# Patient Record
Sex: MALE | Race: WHITE | NOT HISPANIC OR LATINO | Employment: OTHER | URBAN - METROPOLITAN AREA
[De-identification: names, ages, dates, MRNs, and addresses within clinical notes are randomized per-mention and may not be internally consistent; named-entity substitution may affect disease eponyms.]

---

## 2017-03-27 ENCOUNTER — ALLSCRIPTS OFFICE VISIT (OUTPATIENT)
Dept: OTHER | Facility: OTHER | Age: 67
End: 2017-03-27

## 2017-09-12 ENCOUNTER — ALLSCRIPTS OFFICE VISIT (OUTPATIENT)
Dept: OTHER | Facility: OTHER | Age: 67
End: 2017-09-12

## 2018-01-10 NOTE — PSYCH
Psych Med Mgmt    Appearance: was calm and cooperative, adequate hygiene and grooming and good eye contact  Observed mood: was dysphoric and irritable  Observed mood: affect appropriate  Speech: a normal rate  Thought processes: coherent/organized  Hallucinations: no hallucinations present  Thought Content: no delusions  Abnormal Thoughts: The patient has no suicidal thoughts  Orientation: The patient is oriented to person, place and time  Recent and Remote Memory: short term memory intact and long term memory intact  Attention Span And Concentration: concentration intact  Insight: Insight intact  Judgment: His judgment was intact  Muscle Strength And Tone  slow gait with assistive device  The patient is experiencing moderate to severe pain  Risks, Benefits And Possible Side Effects Of Medications: Risks, benefits, and possible side effects of medications explained to patient and patient verbalizes understanding  Discussed with patient Black Box warning on concurrent use of benzodiazepines and opioid medications including sedation, respiratory depression, coma and death  Patient understands the risk of treatment with benzodiazepines in addition to opioids and wants to continue taking those medications  He reports normal appetite, decreased energy, no weight change and decrease in number of sleep hours   seen for bipolar disorder  A few days ago he fell twice but did not seek medical help  No apparent injuries though  No significant change in his clinical status or his life situation  Relationship with significant other is described as strained  No new issues with his children  His insurance will not authorize more than 4 clonazepams a day  Assessment    1  Bipolar mood disorder (296 80) (F31 9)    Plan    1  From  ClonazePAM 1 MG Oral Tablet one t i d + 2 hs To ClonazePAM 1 MG Oral   Tablet one t i d +1 at bedtime   2   BuPROPion HCl ER (XL) 150 MG Oral Tablet Extended Release 24 Hour; 2 every   morning and one at night    3  Gabapentin 400 MG Oral Capsule; one t i d+2 at hs    Review of Systems    Constitutional: No fever, no chills, feels well, no tiredness, no recent weight gain or loss  Cardiovascular: no complaints of slow or fast heart rate, no chest pain, no palpitations  Respiratory: no complaints of shortness of breath, no wheezing, no dyspnea on exertion  Gastrointestinal: no complaints of abdominal pain, no constipation, no nausea, no diarrhea, no vomiting  Genitourinary: no complaints of dysuria, no incontinence, no pelvic pain, no urinary frequency  Musculoskeletal: limb pain  Integumentary: no complaints of skin rash, no itching, no dry skin  Neurological: no complaints of headache, no confusion, no numbness, no dizziness  Active Problems    1  Bipolar mood disorder (296 80) (F31 9)   2  Chronic pain (338 29) (G89 29)    Allergies    1  Cephalosporins   2  Penicillins    Current Meds   1  BuPROPion HCl ER (XL) 150 MG Oral Tablet Extended Release 24 Hour; 2 every   morning and one at night; Therapy: 27ECS8426 to (Last Rx:15Mar2016)  Requested for: 79ZQX8537 Ordered   2  ClonazePAM 1 MG Oral Tablet; one t i d + 2 hs; Therapy: 83UQB5706 to (Last Rx:15Mar2016) Ordered   3  Gabapentin 400 MG Oral Capsule; one t i d+2 at hs;   Therapy: 09RFO9382 to (Last Rx:15Mar2016)  Requested for: 24OZV6693 Ordered   4  Oxycodone-Acetaminophen  MG Oral Tablet; TAKE 1 TABLET 4 TIMES DAILY AS   NEEDED FOR PAIN;   Therapy: 87LVZ6160 to (Evaluate:76Ojs4114); Last Rx:86Phv1624 Ordered    Family Psych History  Mother    1  No pertinent family history    Social History    · Never a smoker    End of Encounter Meds    1  BuPROPion HCl ER (XL) 150 MG Oral Tablet Extended Release 24 Hour; 2 every   morning and one at night; Therapy: 99UZD5870 to (Last Rx:14Nov2016)  Requested for: 80MYV7434 Ordered   2   ClonazePAM 1 MG Oral Tablet; one t i d +1 at bedtime; Therapy: 28GPZ0265 to (Last Rx:14Nov2016) Ordered    3  Gabapentin 400 MG Oral Capsule; one t i d+2 at hs;   Therapy: 92DFP1428 to (Last Rx:14Nov2016)  Requested for: 36RWU9275; Status: ACTIVE   - Transmit to Pharmacy - Awaiting Verification Ordered    4  Oxycodone-Acetaminophen  MG Oral Tablet (Percocet); TAKE 1 TABLET 4 TIMES   DAILY AS NEEDED FOR PAIN;   Therapy: 18TQI3165 to (Evaluate:09Gsv8527);  Last Rx:18Yjt7096 Ordered    Signatures   Electronically signed by : Daniel Almaguer MD; Nov 14 2016  2:59PM EST                       (Author)

## 2018-01-11 NOTE — PSYCH
Psych Med Mgmt    Appearance: was calm and cooperative, adequate hygiene and grooming and good eye contact  Observed mood: was dysphoric  Observed mood: affect was broad  Speech: a normal rate  Thought processes: coherent/organized  Hallucinations: no hallucinations present  Thought Content: no delusions  Abnormal Thoughts: The patient has no suicidal thoughts and no homicidal thoughts  Orientation: The patient is oriented to person, place and time  Recent and Remote Memory: short term memory intact and long term memory intact  Attention Span And Concentration: concentration intact  Insight: Insight intact  Judgment: His judgment was intact  Muscle Strength And Tone  slow with cane  The patient is experiencing moderate to severe pain  On a scale of 0 - 10 the pain severity is a 5  Treatment Recommendations: continue with same medications  continue with psychotherapy  Risks, Benefits And Possible Side Effects Of Medications: Risks, benefits, and possible side effects of medications explained to patient and patient verbalizes understanding  Seen for continuing care and pharmacotherapy  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 and also poor relationship with his daughter  She never visits without a "bodyguard"  His pain persists  cries everyday for multiple reasons,mostly missing his wife and children  conflicts with girlfriend are intermittent  continues to see Dr Andres Agrawal on a regular basis  his TSH is 0 01 but his BP is stable  Assessment    1  Affective bipolar disorder (296 80) (F31 9)    Plan    1  BuPROPion HCl ER (XL) 150 MG Oral Tablet Extended Release 24 Hour; 2 every   morning and one at night   2  ClonazePAM 1 MG Oral Tablet; one t i d + 2 hs    3  Gabapentin 400 MG Oral Capsule; one t i d+2 at hs    Review of Systems    Constitutional: No fever, no chills, feels well, no tiredness, no recent weight gain or loss     Cardiovascular: no complaints of slow or fast heart rate, no chest pain, no palpitations  Respiratory: no complaints of shortness of breath, no wheezing, no dyspnea on exertion  Gastrointestinal: no complaints of abdominal pain, no constipation, no nausea, no diarrhea, no vomiting  Genitourinary: no complaints of dysuria, no incontinence, no pelvic pain, no urinary frequency  Musculoskeletal: arthralgias, limb pain, myalgias and joint stiffness  Integumentary: no complaints of skin rash, no itching, no dry skin  Neurological: no complaints of headache, no confusion, no numbness, no dizziness  Active Problems    1  Chronic pain (338 29) (G89 29)    Allergies    1  Cephalosporins   2  Penicillins    Current Meds   1  BuPROPion HCl ER (XL) 150 MG Oral Tablet Extended Release 24 Hour; 2 every   morning and one at night; Therapy: 55WKE2652 to (Last [place])  Requested for: 23Nov2015 Ordered   2  ClonazePAM 1 MG Oral Tablet; one t i d + 2 hs; Therapy: 18ALI5866 to (Last Rx:23Nov2015) Ordered   3  Gabapentin 400 MG Oral Capsule; one t i d+2 at hs;   Therapy: 06YIM2512 to (Last Rx:23Nov2015)  Requested for: 23Nov2015 Ordered   4  Oxycodone-Acetaminophen  MG Oral Tablet; TAKE 1 TABLET 4 TIMES DAILY AS   NEEDED FOR PAIN;   Therapy: 47COY0800 to (Evaluate:03Vti2145); Last Rx:16Voq0704 Ordered    Family Psych History    1  No pertinent family history    Social History    · Never a smoker    End of Encounter Meds    1  BuPROPion HCl ER (XL) 150 MG Oral Tablet Extended Release 24 Hour; 2 every   morning and one at night; Therapy: 34JIS2136 to (Last Rx:15Mar2016)  Requested for: 12LOS1753 Ordered   2  ClonazePAM 1 MG Oral Tablet; one t i d + 2 hs; Therapy: 39RAM0589 to (Last Rx:15Mar2016) Ordered    3  Gabapentin 400 MG Oral Capsule; one t i d+2 at hs;   Therapy: 40VTB6375 to (Last Rx:15Mar2016)  Requested for: 48QJN8849 Ordered    4   Oxycodone-Acetaminophen  MG Oral Tablet (Percocet); TAKE 1 TABLET 4 TIMES   DAILY AS NEEDED FOR PAIN;   Therapy: 12OZM0453 to (Evaluate:73Qou5521);  Last Rx:28Brm9104 Ordered    Signatures   Electronically signed by : Ej Valladares MD; Mar 15 2016  3:57PM EST                       (Author)

## 2018-01-17 NOTE — PSYCH
Psych Med Mgmt    Appearance: was calm and cooperative, adequate hygiene and grooming and good eye contact  Observed mood: was dysphoric and anxious  Observed mood: affect was broad  Speech: a normal rate  Thought processes: coherent/organized  Hallucinations: no hallucinations present  Thought Content: no delusions  Abnormal Thoughts: The patient has somatic preoccupation  Orientation: The patient is oriented to person, place and time  Recent and Remote Memory: short term memory intact and long term memory intact  Attention Span And Concentration: concentration intact  Insight: Limited insight  Judgment: His judgment was intact  Muscle Strength And Tone  slow but steady  The patient was seen for continuing care and treatment of bipolar disorder  He continues to fixate on his back pain and how this has made him dysfunctional  There has been no change in his relationship with his children  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  Continues to see Dr Donna Roland on a regular basis  Sleep and appetite are variable  Assessment    1  Affective bipolar disorder (296 80) (F31 9)    Review of Systems    Constitutional: feeling poorly and feeling tired  Cardiovascular: no complaints of slow or fast heart rate, no chest pain, no palpitations  Respiratory: no complaints of shortness of breath, no wheezing, no dyspnea on exertion  Gastrointestinal: no complaints of abdominal pain, no constipation, no nausea, no diarrhea, no vomiting  Genitourinary: no complaints of dysuria, no incontinence, no pelvic pain, no urinary frequency  Musculoskeletal: arthralgias, limb pain and myalgias  Neurological: numbness  Active Problems    1  Affective bipolar disorder (296 80) (F31 9)   2  Chronic pain (338 29) (G28 29)    Allergies    1  Cephalosporins   2  Penicillins    Current Meds   1   BuPROPion HCl ER (XL) 150 MG Oral Tablet Extended Release 24 Hour; 2 every   morning and one at night; Therapy: 10LGA8293 to (Last Rx:15Mar2016)  Requested for: 32BWH6200 Ordered   2  ClonazePAM 1 MG Oral Tablet; one t i d + 2 hs; Therapy: 43JZQ7256 to (Last Rx:15Mar2016) Ordered   3  Gabapentin 400 MG Oral Capsule; one t i d+2 at hs;   Therapy: 35JFJ1465 to (Last Rx:15Mar2016)  Requested for: 24UMH3572 Ordered   4  Oxycodone-Acetaminophen  MG Oral Tablet; TAKE 1 TABLET 4 TIMES DAILY AS   NEEDED FOR PAIN;   Therapy: 55TVH7122 to (Evaluate:32Kkb9399); Last Rx:43Wzk1362 Ordered    Family Psych History  Mother    1  No pertinent family history    Social History    · Never a smoker    End of Encounter Meds    1  BuPROPion HCl ER (XL) 150 MG Oral Tablet Extended Release 24 Hour; 2 every   morning and one at night; Therapy: 40WAN4796 to (Last Rx:15Mar2016)  Requested for: 34MBV4271 Ordered   2  ClonazePAM 1 MG Oral Tablet; one t i d + 2 hs; Therapy: 75ZGR0388 to (Last Rx:15Mar2016) Ordered    3  Gabapentin 400 MG Oral Capsule; one t i d+2 at hs;   Therapy: 79DQN6596 to (Last Rx:15Mar2016)  Requested for: 19WUX6494 Ordered    4  Oxycodone-Acetaminophen  MG Oral Tablet (Percocet); TAKE 1 TABLET 4 TIMES   DAILY AS NEEDED FOR PAIN;   Therapy: 40QYT0675 to (Evaluate:31Kre8105);  Last Rx:65Eyf9327 Ordered    Future Appointments    Date/Time Provider Specialty Site   11/14/2016 02:30 PM Didier Paulino MD Psychiatry [facility]     Signatures   Electronically signed by : Javier Granado MD; Jul 18 2016  5:25PM EST                       (Author)

## 2018-01-18 NOTE — PSYCH
Psych Med Mgmt    Appearance: was calm and cooperative, adequate hygiene and grooming and good eye contact  Observed mood: affect was broad, but affect appropriate  Speech: a normal rate and fluent  Thought processes: coherent/organized  Hallucinations: no hallucinations present  Thought Content: no delusions  Abnormal Thoughts: The patient has somatic preoccupation  Orientation: The patient is oriented to person, place and time  Recent and Remote Memory: short term memory intact and long term memory intact  Attention Span And Concentration: concentration intact  Insight: Insight intact  Judgment: His judgment was intact  Muscle Strength And Tone  walks slowly and with a cane  Fund of knowledge: Patient displays adequate knowledge of current events  The patient is experiencing moderate to severe pain  Treatment Recommendations: continue with same medications  RTO in a few months  Risks, Benefits And Possible Side Effects Of Medications: Risks, benefits, and possible side effects of medications explained to patient and patient verbalizes understanding  seen for bipolar disorder  He had spinal surgery in February and continues to have pain and paresthesias  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 He then talked about his years in [employer] with [employer] atrocities he witness or participated in without giving any details  He had never talked about this to anyone until recently when it came up during therapy with Dr Donna Roland who he sees weekly  Assessment    1  Bipolar mood disorder (296 80) (F31 9)   2  Chronic pain (338 29) (G89 29)    Review of Systems    Constitutional: No fever, no chills, feels well, no tiredness, no recent weight gain or loss  Cardiovascular: no complaints of slow or fast heart rate, no chest pain, no palpitations  Respiratory: no complaints of shortness of breath, no wheezing, no dyspnea on exertion     Gastrointestinal: no complaints of abdominal pain, no constipation, no nausea, no diarrhea, no vomiting  Genitourinary: no complaints of dysuria, no incontinence, no pelvic pain, no urinary frequency  Musculoskeletal: no complaints of arthralgia, no myalgias, no limb pain, no joint stiffness  Integumentary: no complaints of skin rash, no itching, no dry skin  Neurological: no complaints of headache, no confusion, no numbness, no dizziness  Other Symptoms: pain and paresthesias  Active Problems    1  Bipolar mood disorder (296 80) (F31 9)   2  Chronic pain (338 29) (G89 29)    Allergies    1  Cephalosporins   2  Penicillins    Current Meds   1  BuPROPion HCl ER (XL) 150 MG Oral Tablet Extended Release 24 Hour; 2 every   morning and one at night; Therapy: 52UFI7125 to (Last Rx:27Mar2017)  Requested for: 27Mar2017 Ordered   2  ClonazePAM 1 MG Oral Tablet; one t i d +1 at bedtime; Therapy: 57RTB1187 to (Last Rx:27Mar2017) Ordered   3  Gabapentin 400 MG Oral Capsule; one t i d+2 at hs;   Therapy: 26FGG1677 to (Last Rx:27Mar2017)  Requested for: 95GMN5859 Ordered   4  Oxycodone-Acetaminophen  MG Oral Tablet; TAKE 1 TABLET 4 TIMES DAILY AS   NEEDED FOR PAIN;   Therapy: 39XUB2052 to (Evaluate:73Riv5166); Last Rx:90Iwg2118 Ordered    Family Psych History  Mother    1  No pertinent family history    Social History    · Never a smoker    End of Encounter Meds    1  BuPROPion HCl ER (XL) 150 MG Oral Tablet Extended Release 24 Hour; 2 every   morning and one at night; Therapy: 95JXM2922 to (Last Rx:27Mar2017)  Requested for: 27Mar2017 Ordered   2  ClonazePAM 1 MG Oral Tablet; one t i d +1 at bedtime; Therapy: 33VEK1418 to (Last Rx:27Mar2017) Ordered    3  Gabapentin 400 MG Oral Capsule; one t i d+2 at hs;   Therapy: 16RVK5513 to (Last Rx:27Mar2017)  Requested for: 63AVB8417 Ordered    4   Oxycodone-Acetaminophen  MG Oral Tablet (Percocet); TAKE 1 TABLET 4 TIMES   DAILY AS NEEDED FOR PAIN;   Therapy: 50TRM3038 to (Evaluate:31Oih4859);  Last Rx:71Pyf5671 Ordered    Signatures   Electronically signed by : Valeria Helm MD; Sep 12 2017  4:29PM EST                       (Author)

## 2018-01-18 NOTE — PSYCH
Psych Med Mgmt    Appearance: was calm and cooperative  Observed mood: mood appropriate  Observed mood: affect appropriate  Speech: a normal rate  Thought processes: tangential    speaking of [employer] and [employer]  Hallucinations: no hallucinations present  Thought Content: no delusions  Abnormal Thoughts: The patient has no suicidal thoughts and no homicidal thoughts  Orientation: The patient is oriented to person, place and time  Recent and Remote Memory: short term memory intact and long term memory intact  Attention Span And Concentration: concentration intact  Insight: Insight intact  Judgment: His judgment was intact  Treatment Recommendations: Continue with current medications and follow up in 6 months  He reports decreased appetite, normal appetite, normal energy level, no weight change and decrease in number of sleep hours   Patient being seen for follow-up of bipolar disorder  He reports feeling good on his medications  He feels like he is finally able to function  Relationship with significant other continues to remain strained, and he refers to as "more like a  " Patient somewhat tangential in his speech today  Religiously preoccupied and is talking about [employer] located in different countries and speaking of the [place] that he has built in his backyard  Talking about his interactions with the [facility]  Difficult to redirect  States that sleep has been poor recently, as he is only getting about 3 hours of sleep per night  Eating has been "sporadic " He does not feel as though his lack of sleep has been affecting his energy  Patient recently had a decompression procedure on lumbar spine  Using a walker to ambulate and suffering from back pain  Assessment    1  Bipolar mood disorder (997 80) (F31 9)    Plan    1  BuPROPion HCl ER (XL) 150 MG Oral Tablet Extended Release 24 Hour; 2 every   morning and one at night   2  ClonazePAM 1 MG Oral Tablet; one t i d +1 at bedtime    3  Gabapentin 400 MG Oral Capsule; one t i d+2 at hs    Review of Systems    Constitutional: No fever, no chills, feels well, no tiredness, no recent weight gain or loss  Cardiovascular: no complaints of slow or fast heart rate, no chest pain, no palpitations  Respiratory: no complaints of shortness of breath, no wheezing, no dyspnea on exertion  Gastrointestinal: no complaints of abdominal pain, no constipation, no nausea, no diarrhea, no vomiting  Genitourinary: no complaints of dysuria, no incontinence, no pelvic pain, no urinary frequency  Musculoskeletal: limb pain and slow, using a walker; back pain  Integumentary: no complaints of skin rash, no itching, no dry skin  Neurological: no complaints of headache, no confusion, no numbness, no dizziness  Active Problems    1  Bipolar mood disorder (296 80) (F31 9)   2  Chronic pain (338 29) (G89 29)    Allergies    1  Cephalosporins   2  Penicillins    Current Meds   1  BuPROPion HCl ER (XL) 150 MG Oral Tablet Extended Release 24 Hour; 2 every   morning and one at night; Therapy: 29BKJ8933 to (Last Rx:14Nov2016)  Requested for: 45AJE1165 Ordered   2  ClonazePAM 1 MG Oral Tablet; one t i d +1 at bedtime; Therapy: 37IQV0571 to (Last Rx:14Nov2016) Ordered   3  Gabapentin 400 MG Oral Capsule; one t i d+2 at hs;   Therapy: 16LNB8018 to (Last Rx:14Nov2016)  Requested for: 08ANH0365 Ordered   4  Oxycodone-Acetaminophen  MG Oral Tablet; TAKE 1 TABLET 4 TIMES DAILY AS   NEEDED FOR PAIN;   Therapy: 00OXW8429 to (Evaluate:47Ato0325); Last Rx:71Grh3822 Ordered    Family Psych History  Mother    1  No pertinent family history    Social History    · Never a smoker    End of Encounter Meds    1  BuPROPion HCl ER (XL) 150 MG Oral Tablet Extended Release 24 Hour; 2 every   morning and one at night; Therapy: 66IEX6824 to (Last Rx:27Mar2017)  Requested for: 27Mar2017 Ordered   2   ClonazePAM 1 MG Oral Tablet; one t i d +1 at bedtime; Therapy: 36MLV9634 to (Last Rx:27Mar2017) Ordered    3  Gabapentin 400 MG Oral Capsule; one t i d+2 at hs;   Therapy: 21NJG6197 to (Last Rx:27Mar2017)  Requested for: 84PEU8884 Ordered    4  Oxycodone-Acetaminophen  MG Oral Tablet (Percocet); TAKE 1 TABLET 4 TIMES   DAILY AS NEEDED FOR PAIN;   Therapy: 96KCV7514 to (Evaluate:24Efu0898);  Last Rx:34Myg1696 Ordered    Signatures   Electronically signed by : Lori Fuller [facility]; Mar 27 2017  4:33PM EST                       (Author)    Electronically signed by : Chloé Palomo MD; Mar 27 2017  5:25PM EST

## 2018-04-18 ENCOUNTER — OFFICE VISIT (OUTPATIENT)
Dept: PSYCHIATRY | Facility: CLINIC | Age: 68
End: 2018-04-18
Payer: MEDICARE

## 2018-04-18 DIAGNOSIS — F31.70 BIPOLAR AFFECTIVE DISORDER IN REMISSION (HCC): Primary | ICD-10-CM

## 2018-04-18 PROCEDURE — 99213 OFFICE O/P EST LOW 20 MIN: CPT | Performed by: PSYCHIATRY & NEUROLOGY

## 2018-04-18 RX ORDER — BUPROPION HYDROCHLORIDE 150 MG/1
TABLET, EXTENDED RELEASE ORAL
Qty: 270 TABLET | Refills: 1 | Status: SHIPPED | OUTPATIENT
Start: 2018-04-18 | End: 2018-08-20 | Stop reason: SDUPTHER

## 2018-04-18 RX ORDER — GABAPENTIN 400 MG/1
1 CAPSULE ORAL 4 TIMES DAILY
COMMUNITY
Start: 2015-07-29 | End: 2018-04-18 | Stop reason: SDUPTHER

## 2018-04-18 RX ORDER — CLONAZEPAM 1 MG/1
1 TABLET ORAL 4 TIMES DAILY
Qty: 360 TABLET | Refills: 1 | Status: SHIPPED | OUTPATIENT
Start: 2018-04-18 | End: 2018-08-20 | Stop reason: SDUPTHER

## 2018-04-18 RX ORDER — GABAPENTIN 400 MG/1
400 CAPSULE ORAL 4 TIMES DAILY
Qty: 360 CAPSULE | Refills: 1 | Status: SHIPPED | OUTPATIENT
Start: 2018-04-18 | End: 2018-08-20 | Stop reason: SDUPTHER

## 2018-04-18 RX ORDER — CLONAZEPAM 1 MG/1
1 TABLET ORAL 4 TIMES DAILY
COMMUNITY
Start: 2015-07-29 | End: 2018-04-18 | Stop reason: SDUPTHER

## 2018-04-18 RX ORDER — BUPROPION HYDROCHLORIDE 150 MG/1
3 TABLET ORAL DAILY
COMMUNITY
Start: 2015-11-23 | End: 2018-04-18 | Stop reason: CLARIF

## 2018-04-18 NOTE — PSYCH
Patient ID: Yanet Garcia is a 75 y o  male  HPI ROS Appetite Changes and Sleep: normal appetite, normal energy level, no weight change and decrease in number of sleep hours due to frequent awakenings    Review Of Systems:     Mood Normal   Thought Content Normal   General Sleep Disturbances   Gastrointestinal Normal   Neurological Normal        Laboratory Results: No results found for this or any previous visit  Subjective:      Wakes himself up crying every night and doesn't know why although he speculates that it could be related to his negative experiences in the past   Normal appetite   Relationship with children is stable  He reported that since his last visit he had 2 episodes of "temporary paralysis" and Med[facility] to [facility] records exist and he then said he got the dates mixed up (confabulations?)  Still in therapy with Dr Romie Anthony  Objective:   Seems to be at baseline    Mental status:  Appearance calm and cooperative , adequate hygiene and grooming and good eye contact    Mood euthymic   Affect affect was broad   Speech a normal rate and fluent   Thought Processes coherent/organized   Hallucinations no hallucinations present    Thought Content no delusional thoughts verbalized   Abnormal Thoughts no suicidal thoughts    Orientation A+O x 3   Remote Memory short term memory intact and long term memory intact   Attention Span concentration intact   Intellect Not Formally Assessed   Insight Insight intact   Judgement judgment was intact   Muscle Strength Normal gait  and needs cane   Language no difficulty naming common objects and no difficulty repeating a phrase    Pain none       Assessment/Plan:       Diagnoses and all orders for this visit:    Bipolar affective disorder in remission ([facility] [address] )  -     gabapentin (NEURONTIN) 400 mg capsule; Take 1 capsule (400 mg total) by mouth 4 (four) times a day One t i d +2 hs  -     clonazePAM (KlonoPIN) 1 mg tablet;  Take 1 tablet (1 mg total) by mouth 4 (four) times a day  -     buPROPion (WELLBUTRIN SR) 150 mg 12 hr tablet; 2 in the morning + one at night    Other orders  -     Discontinue: gabapentin (NEURONTIN) 400 mg capsule; Take 1 capsule by mouth 4 (four) times a day One t i d +2 hs  -     Discontinue: clonazePAM (KlonoPIN) 1 mg tablet; Take 1 tablet by mouth 4 (four) times a day  -     Discontinue: buPROPion (WELLBUTRIN XL) 150 mg 24 hr tablet;  Take 3 tablets by mouth daily 2 in the morning+ one at night             Treatment Recommendations- Risks Benefits      Risks, Benefits And Possible Side Effects Of Medications:  Risks, benefits, and possible side effects of medications explained to patient and patient verbalizes understanding

## 2018-08-20 ENCOUNTER — OFFICE VISIT (OUTPATIENT)
Dept: PSYCHIATRY | Facility: CLINIC | Age: 68
End: 2018-08-20
Payer: MEDICARE

## 2018-08-20 DIAGNOSIS — F31.70 BIPOLAR AFFECTIVE DISORDER IN REMISSION (HCC): ICD-10-CM

## 2018-08-20 PROCEDURE — 99213 OFFICE O/P EST LOW 20 MIN: CPT | Performed by: PSYCHIATRY & NEUROLOGY

## 2018-08-20 RX ORDER — GABAPENTIN 400 MG/1
400 CAPSULE ORAL 4 TIMES DAILY
Qty: 360 CAPSULE | Refills: 1 | Status: SHIPPED | OUTPATIENT
Start: 2018-08-20 | End: 2018-08-20 | Stop reason: SDUPTHER

## 2018-08-20 RX ORDER — CLONAZEPAM 1 MG/1
1 TABLET ORAL 4 TIMES DAILY
Qty: 360 TABLET | Refills: 1 | Status: SHIPPED | OUTPATIENT
Start: 2018-08-20 | End: 2018-12-05 | Stop reason: SDUPTHER

## 2018-08-20 RX ORDER — GABAPENTIN 400 MG/1
400 CAPSULE ORAL 4 TIMES DAILY
Qty: 360 CAPSULE | Refills: 1 | Status: SHIPPED | OUTPATIENT
Start: 2018-08-20 | End: 2018-12-05 | Stop reason: SDUPTHER

## 2018-08-20 RX ORDER — BUPROPION HYDROCHLORIDE 150 MG/1
TABLET, EXTENDED RELEASE ORAL
Qty: 270 TABLET | Refills: 1 | Status: SHIPPED | OUTPATIENT
Start: 2018-08-20 | End: 2018-08-20 | Stop reason: SDUPTHER

## 2018-08-20 RX ORDER — BUPROPION HYDROCHLORIDE 150 MG/1
TABLET, EXTENDED RELEASE ORAL
Qty: 270 TABLET | Refills: 1 | Status: SHIPPED | OUTPATIENT
Start: 2018-08-20 | End: 2018-12-05 | Stop reason: SDUPTHER

## 2018-08-20 RX ORDER — CLONAZEPAM 1 MG/1
1 TABLET ORAL 4 TIMES DAILY
Qty: 360 TABLET | Refills: 1 | Status: SHIPPED | OUTPATIENT
Start: 2018-08-20 | End: 2018-08-20 | Stop reason: SDUPTHER

## 2018-08-20 NOTE — PSYCH
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 and attributes that to back pain  Has disturbing dreams, waking up crying but does not remember these dreams  Still in treatment with Dr Huertas Doing  Objective: At baseline  Mental status:  Appearance calm and cooperative , adequate hygiene and grooming and good eye contact    Mood euthymic   Affect affect was broad   Speech Normal rate and Normal volume   Thought Processes coherent/organized   Hallucinations no hallucinations present    Thought Content no delusional thoughts verbalized   Abnormal Thoughts no suicidal thoughts  and no homicidal thoughts    Orientation A+O x 3   Memory function Not tested   Attention Span concentration intact   Intellect Not Formally Assessed   Insight Insight intact   Judgement judgment was intact   Muscle Strength Abnormal gait   Language no difficulty naming common objects and no difficulty repeating a phrase    Pain Back pain       Assessment/Plan:       Diagnoses and all orders for this visit:    Bipolar affective disorder in remission ([facility] [address] )  -     Discontinue: gabapentin (NEURONTIN) 400 mg capsule; Take 1 capsule (400 mg total) by mouth 4 (four) times a day One t i d +2 hs  -     Discontinue: clonazePAM (KlonoPIN) 1 mg tablet;  Take 1 tablet (1 mg total) by mouth 4 (four) times a day  -     Discontinue: buPROPion (WELLBUTRIN SR) 150 mg 12 hr tablet; 2 in the morning + one at night  -     Discontinue: gabapentin (NEURONTIN) 400 mg capsule; Take 1 capsule (400 mg total) by mouth 4 (four) times a day One t i d +2 hs  -     Discontinue: buPROPion (WELLBUTRIN SR) 150 mg 12 hr tablet; 2 in the morning + one at night  -     gabapentin (NEURONTIN) 400 mg capsule; Take 1 capsule (400 mg total) by mouth 4 (four) times a day One t i d +2 hs  -     clonazePAM (KlonoPIN) 1 mg tablet;  Take 1 tablet (1 mg total) by mouth 4 (four) times a day  -     buPROPion (WELLBUTRIN SR) 150 mg 12 hr tablet; 2 in the morning + one at night            Treatment Recommendations- Risks Benefits      Risks, Benefits And Possible Side Effects Of Medications:  Risks, benefits, and possible side effects of medications explained to patient and patient verbalizes understanding

## 2018-12-05 ENCOUNTER — OFFICE VISIT (OUTPATIENT)
Dept: PSYCHIATRY | Facility: CLINIC | Age: 68
End: 2018-12-05
Payer: MEDICARE

## 2018-12-05 DIAGNOSIS — F31.70 BIPOLAR AFFECTIVE DISORDER IN REMISSION (HCC): ICD-10-CM

## 2018-12-05 DIAGNOSIS — F31.70 BIPOLAR DISORDER IN PARTIAL REMISSION, MOST RECENT EPISODE UNSPECIFIED TYPE (HCC): Primary | ICD-10-CM

## 2018-12-05 PROCEDURE — 99213 OFFICE O/P EST LOW 20 MIN: CPT | Performed by: PSYCHIATRY & NEUROLOGY

## 2018-12-05 RX ORDER — GABAPENTIN 400 MG/1
CAPSULE ORAL
Qty: 450 CAPSULE | Refills: 1 | Status: SHIPPED | OUTPATIENT
Start: 2018-12-05 | End: 2019-06-05 | Stop reason: SDUPTHER

## 2018-12-05 RX ORDER — BUPROPION HYDROCHLORIDE 150 MG/1
TABLET, EXTENDED RELEASE ORAL
Qty: 270 TABLET | Refills: 1 | Status: SHIPPED | OUTPATIENT
Start: 2018-12-05 | End: 2019-06-05 | Stop reason: SDUPTHER

## 2018-12-05 RX ORDER — CLONAZEPAM 1 MG/1
1 TABLET ORAL 4 TIMES DAILY
Qty: 360 TABLET | Refills: 1 | Status: SHIPPED | OUTPATIENT
Start: 2018-12-05 | End: 2019-06-05 | Stop reason: SDUPTHER

## 2018-12-05 NOTE — PSYCH
Patient ID: Jann Erazo is a 70 y o  male  HPI ROS Appetite Changes and Sleep: normal appetite, normal energy level, no weight change and normal number of sleep hours    Review Of Systems:     Mood variable   Thought Content Normal   General Normal    Gastrointestinal Normal   Neurological Normal        Laboratory Results: No results found for this or any previous visit  Subjective:    Seen for bipolar disorder  Still dealing with same physical issues with back pain  His pain has been controlled with epidural injections  Somatically preoccupied mainly on his pain  No change in relationship with his children   Objective:   Seems to be at baseline  Mental status:  Appearance calm and cooperative , adequate hygiene and grooming and good eye contact    Mood dysphoric   Affect affect was broad and affect appropriate    Speech Normal rate and Normal volume   Thought Processes coherent/organized   Hallucinations no hallucinations present    Thought Content no delusional thoughts verbalized-somatically preoccupied  Abnormal Thoughts no suicidal thoughts  and no homicidal thoughts    Orientation A+O x 3   Memory function Not tested   Attention Span concentration intact   Intellect Not Formally Assessed   Insight Insight intact   Judgement judgment was intact   Muscle Strength Muscle strength and tone were normal   Language no difficulty naming common objects   Pain none       Assessment/Plan:      Treatment Recommendations- Risks Benefits    Same meds and RTO in 4 months    Risks, Benefits And Possible Side Effects Of Medications:  Risks, benefits, and possible side effects of medications explained to patient and patient verbalizes understanding

## 2019-06-05 ENCOUNTER — OFFICE VISIT (OUTPATIENT)
Dept: PSYCHIATRY | Facility: CLINIC | Age: 69
End: 2019-06-05
Payer: MEDICARE

## 2019-06-05 DIAGNOSIS — F31.70 BIPOLAR AFFECTIVE DISORDER IN REMISSION (HCC): ICD-10-CM

## 2019-06-05 PROCEDURE — 99213 OFFICE O/P EST LOW 20 MIN: CPT | Performed by: PSYCHIATRY & NEUROLOGY

## 2019-06-05 RX ORDER — GABAPENTIN 400 MG/1
CAPSULE ORAL
Qty: 450 CAPSULE | Refills: 1 | Status: SHIPPED | OUTPATIENT
Start: 2019-06-05 | End: 2019-12-04 | Stop reason: SDUPTHER

## 2019-06-05 RX ORDER — BUPROPION HYDROCHLORIDE 150 MG/1
TABLET, EXTENDED RELEASE ORAL
Qty: 270 TABLET | Refills: 1 | Status: SHIPPED | OUTPATIENT
Start: 2019-06-05 | End: 2019-12-04 | Stop reason: SDUPTHER

## 2019-06-05 RX ORDER — CLONAZEPAM 1 MG/1
1 TABLET ORAL 4 TIMES DAILY
Qty: 360 TABLET | Refills: 1 | Status: SHIPPED | OUTPATIENT
Start: 2019-06-05 | End: 2019-12-04 | Stop reason: SDUPTHER

## 2019-12-04 DIAGNOSIS — F31.70 BIPOLAR AFFECTIVE DISORDER IN REMISSION (HCC): ICD-10-CM

## 2019-12-04 RX ORDER — CLONAZEPAM 1 MG/1
1 TABLET ORAL 4 TIMES DAILY
Qty: 360 TABLET | Refills: 1 | Status: SHIPPED | OUTPATIENT
Start: 2019-12-04 | End: 2020-08-10 | Stop reason: SDUPTHER

## 2019-12-04 RX ORDER — GABAPENTIN 400 MG/1
CAPSULE ORAL
Qty: 450 CAPSULE | Refills: 1 | Status: SHIPPED | OUTPATIENT
Start: 2019-12-04 | End: 2020-08-10 | Stop reason: SDUPTHER

## 2019-12-04 RX ORDER — BUPROPION HYDROCHLORIDE 150 MG/1
TABLET, EXTENDED RELEASE ORAL
Qty: 270 TABLET | Refills: 1 | Status: SHIPPED | OUTPATIENT
Start: 2019-12-04 | End: 2020-06-08

## 2019-12-26 ENCOUNTER — OFFICE VISIT (OUTPATIENT)
Dept: PSYCHIATRY | Facility: CLINIC | Age: 69
End: 2019-12-26
Payer: MEDICARE

## 2019-12-26 DIAGNOSIS — F31.70 BIPOLAR AFFECTIVE DISORDER IN REMISSION (HCC): Primary | ICD-10-CM

## 2019-12-26 PROCEDURE — 99213 OFFICE O/P EST LOW 20 MIN: CPT | Performed by: PSYCHIATRY & NEUROLOGY

## 2019-12-26 NOTE — PSYCH
Patient ID: Morgan Rodriguez is a 78 y o  male  Review Of Systems:     Mood Euthymic   Thought Content Normal   General As HPI   Physical symptoms As Noted in HPI       Laboratory Results: No results found for this or any previous visit  Subjective:    Seen for bipolar disorder  Continues to have pain and other chronic medical problems  His 15years old granddaughter was diagnosed with pancreatic cancer  Same dynamics in relationships with his children  Still in therapy with Dr Marie Preston  His PCP took him off of opiates and prescribed Cymbalta 30 mg daily to control opiate withdrawal according to the patient  He will be stopping Cymbalta  Objective:   Overall stable    Mental status:  Appearance calm and cooperative , adequate hygiene and grooming and good eye contact    Mood euthymic   Affect affect was broad   Speech Normal rate and Normal volume   Thought Processes coherent/organized   Hallucinations no hallucinations present    Thought Content no delusional thoughts verbalized   Abnormal Thoughts no suicidal thoughts  and no homicidal thoughts    Orientation A+O x 3       Assessment/Plan:       Diagnoses and all orders for this visit:    Bipolar affective disorder in remission ([facility] [address] )        1   Bipolar affective disorder in remission [facility])        Treatment Recommendations- Risks Benefits      Risks, Benefits And Possible Side Effects Of Medications:  Risks, benefits, and possible side effects of medications explained to patient and patient verbalizes understanding

## 2020-06-04 DIAGNOSIS — F31.70 BIPOLAR AFFECTIVE DISORDER IN REMISSION (HCC): ICD-10-CM

## 2020-06-08 RX ORDER — BUPROPION HYDROCHLORIDE 150 MG/1
TABLET, EXTENDED RELEASE ORAL
Qty: 270 TABLET | Refills: 0 | Status: SHIPPED | OUTPATIENT
Start: 2020-06-08 | End: 2020-08-10 | Stop reason: SDUPTHER

## 2020-08-10 ENCOUNTER — OFFICE VISIT (OUTPATIENT)
Dept: PSYCHIATRY | Facility: CLINIC | Age: 70
End: 2020-08-10
Payer: MEDICARE

## 2020-08-10 DIAGNOSIS — F31.70 BIPOLAR AFFECTIVE DISORDER IN REMISSION (HCC): ICD-10-CM

## 2020-08-10 PROCEDURE — 99213 OFFICE O/P EST LOW 20 MIN: CPT | Performed by: PSYCHIATRY & NEUROLOGY

## 2020-08-10 RX ORDER — BUPROPION HYDROCHLORIDE 150 MG/1
TABLET, EXTENDED RELEASE ORAL
Qty: 270 TABLET | Refills: 1 | Status: SHIPPED | OUTPATIENT
Start: 2020-08-10 | End: 2021-02-26 | Stop reason: SDUPTHER

## 2020-08-10 RX ORDER — CLONAZEPAM 1 MG/1
1 TABLET ORAL 4 TIMES DAILY
Qty: 360 TABLET | Refills: 1 | Status: SHIPPED | OUTPATIENT
Start: 2020-08-10 | End: 2021-02-26 | Stop reason: SDUPTHER

## 2020-08-10 RX ORDER — GABAPENTIN 400 MG/1
CAPSULE ORAL
Qty: 450 CAPSULE | Refills: 1 | Status: SHIPPED | OUTPATIENT
Start: 2020-08-10 | End: 2021-02-26 | Stop reason: SDUPTHER

## 2020-08-10 NOTE — PSYCH
Progress Note - Behavioral Health   Greta [place] 69 y o  male MRN: [id number]  Unit/Bed#:  Encounter: [id number]    The patient was seen for continuing care and pharmacotherapy  Since his last visit he has had a number of health problems and falls  He tells me that I had given him my home phone number years ago and when he tried to contact  me the person who answered the phone was rude to him  He might be confabulating  He is disheveled and appears to be confused  He then apologized to me for the misunderstanding  He reports that he lives independently  His children don't bother with him  Denies alcohol consumption  Current Mental Status Evaluation:  Appearance:  Good eye contact and disheveled  Somewhat eccentric   Behavior:  calm and cooperative   Mood:  dysphoric   Affect: appropriate   Speech: Normal rate and Normal volume   Thought Process: Tangential- confabulation   Thought Content:  Does not verbalize delusional material   Perceptual Disturbances: Denies hallucinations and does not appear to be responding to internal stimuli   Risk Potential: No suicidal or homicidal ideation   Orientation:        Progress Toward Goals: At baseline      1  Bipolar affective disorder in remission (HCC)  buPROPion (WELLBUTRIN SR) 150 mg 12 hr tablet    clonazePAM (KlonoPIN) 1 mg tablet    gabapentin (NEURONTIN) 400 mg capsule        Recommended Treatment: Continue with pharmacotherapy  RTO in 6 months

## 2020-08-13 ENCOUNTER — DOCUMENTATION (OUTPATIENT)
Dept: PSYCHIATRY | Facility: CLINIC | Age: 70
End: 2020-08-13

## 2021-02-26 ENCOUNTER — TELEMEDICINE (OUTPATIENT)
Dept: PSYCHIATRY | Facility: CLINIC | Age: 71
End: 2021-02-26
Payer: MEDICARE

## 2021-02-26 DIAGNOSIS — F31.70 BIPOLAR AFFECTIVE DISORDER IN REMISSION (HCC): ICD-10-CM

## 2021-02-26 DIAGNOSIS — F31.0 BIPOLAR AFFECTIVE DISORDER, CURRENT EPISODE HYPOMANIC (HCC): Primary | ICD-10-CM

## 2021-02-26 PROCEDURE — 99443 PR PHYS/QHP TELEPHONE EVALUATION 21-30 MIN: CPT | Performed by: NURSE PRACTITIONER

## 2021-02-26 RX ORDER — BUPROPION HYDROCHLORIDE 150 MG/1
TABLET, EXTENDED RELEASE ORAL
Qty: 270 TABLET | Refills: 1 | Status: SHIPPED | OUTPATIENT
Start: 2021-02-26 | End: 2021-07-14 | Stop reason: SDUPTHER

## 2021-02-26 RX ORDER — CLONAZEPAM 1 MG/1
1 TABLET ORAL 4 TIMES DAILY
Qty: 120 TABLET | Refills: 2 | Status: SHIPPED | OUTPATIENT
Start: 2021-02-26 | End: 2021-07-14 | Stop reason: SDUPTHER

## 2021-02-26 RX ORDER — GABAPENTIN 400 MG/1
CAPSULE ORAL
Qty: 450 CAPSULE | Refills: 1 | Status: SHIPPED | OUTPATIENT
Start: 2021-02-26 | End: 2021-07-14 | Stop reason: SDUPTHER

## 2021-02-27 NOTE — PSYCH
TREATMENT PLAN (Medication Management Only)        [facility]    Name and Date of Birth:  Rajan Whitlock 69 y o  1950  Date of Treatment Plan: February 26, 2021  Diagnosis/Diagnoses:    1  Bipolar affective disorder, current episode hypomanic ([facility] [address] )    2  Bipolar affective disorder in remission [facility])      Strengths/Personal Resources for Self-Care: supportive family, supportive friends  Area/Areas of need (in own words):   I am doing okay  1  Long Term Goal: " to continue to remain at baseline"  Target Date: 6 months - 8/26/2021  Person/Persons responsible for completion of goal: Mony Nails  2  Short Term Objective (s) - How will we reach this goal?:   A  Provider new recommended medication/dosage changes and/or continue medication(s): continue current medications as prescribed  B   N/A  Target Date: 6 months - 8/26/2021  Person/Persons Responsible for Completion of Goal: Mony Nails  Progress Towards Goals: stable  Treatment Modality: Med management  Review due 6 months from date of this plan: 6 months - 8/26/2021  Expected length of service: over 1 year  My Physician/PA/NP and I have developed this plan together and I agree to work on the goals and objectives  I understand the treatment goals that were developed for my treatment

## 2021-02-27 NOTE — PSYCH
Virtual Brief Visit    Assessment/Plan:    Problem List Items Addressed This Visit        Other    Bipolar mood disorder ([facility] [address] ) - Primary    Relevant Medications    buPROPion (WELLBUTRIN SR) 150 mg 12 hr tablet    gabapentin (NEURONTIN) 400 mg capsule    clonazePAM (KlonoPIN) 1 mg tablet                Reason for visit is A medication management appointment for treatment of bipolar mood disorder  Encounter provider EDWARD Prince    Provider located at [address] [id number]    Recent Visits  No visits were found meeting these conditions  Showing recent visits within past 7 days and meeting all other requirements     Today's Visits  Date Type Provider Dept   02/26/21 Telemedicine Andreas [facility] [place] today's visits and meeting all other requirements     Future Appointments  No visits were found meeting these conditions  Showing future appointments within next 150 days and meeting all other requirements        After connecting through telephone, the patient was identified by name and date of birth  Irvin Rod was informed that this is a telemedicine visit and that the visit is being conducted through telephone  My office door was closed  No one else was in the room  He acknowledged consent and understanding of privacy and security of the platform  The patient has agreed to participate and understands he can discontinue the visit at any time  Patient is aware this is a billable service  Subjective    Irvin Rod is a 69 y o  male   Has a history of bipolar mood disorder  On examination today, the patient is quite verbose  He goes on to tell me about his life and all the careers he has sought out from young adulthood to the present    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  He continues to elizabeth many health issues  Psychiatrically, again, he is verbose but this seems to be his baseline  He does not have any mood instability or lability that affect his daily functioning  He tells me he is eating well and sleeping well and is able to care for his daily needs  He does not overuse or abuse any of his medications  He is not interested in any medication changes or adjustments at this time  He will continue on Wellbutrin  mg taking 3 daily, Klonopin  1 mg q i d  and gabapentin 400 mg t i d  and 2 at bedtime  Follow-up with me is in 6 months  Patient is aware of our 24 hour service and is also aware that if his mood changes, she he can present himself to the emergency room if needed  Mental status exam:  Patient is awake alert and oriented x3  Mood is stable  Patient is not suicidal, not homicidal and not psychotic  Speech is verbose  Thoughts are tangential   Attention span is fair  Impulse control is good  Judgment insight are fair  Memory is good  Past Medical History:   Diagnosis Date    Bipolar mood disorder ([facility] [address] ) 3/15/2016       No past surgical history on file  Current Outpatient Medications   Medication Sig Dispense Refill    buPROPion (WELLBUTRIN SR) 150 mg 12 hr tablet Two in the morning and one at night 270 tablet 1    clonazePAM (KlonoPIN) 1 mg tablet Take 1 tablet (1 mg total) by mouth 4 (four) times a day 120 tablet 2    gabapentin (NEURONTIN) 400 mg capsule One t i d 2 hs 450 capsule 1     No current facility-administered medications for this visit  Not on File    Review of Systems    There were no vitals filed for this visit  I spent 25 minutes with patient today in which greater than 50% of the time was spent in counseling/coordination of care regarding Medication management and treatment  VIRTUAL VISIT DISCLAIMER    Vincent Anselmo acknowledges that he has consented to an online visit or consultation  He understands that the online visit is based solely on information provided by him, and that, in the absence of a face-to-face physical evaluation by the physician, the diagnosis he receives is both limited and provisional in terms of accuracy and completeness  This is not intended to replace a full medical face-to-face evaluation by the physician  Josef Moran understands and accepts these terms

## 2021-07-14 DIAGNOSIS — F31.70 BIPOLAR AFFECTIVE DISORDER IN REMISSION (HCC): ICD-10-CM

## 2021-07-14 RX ORDER — CLONAZEPAM 1 MG/1
1 TABLET ORAL 4 TIMES DAILY
Qty: 120 TABLET | Refills: 2 | Status: SHIPPED | OUTPATIENT
Start: 2021-07-14

## 2021-07-14 RX ORDER — GABAPENTIN 400 MG/1
CAPSULE ORAL
Qty: 450 CAPSULE | Refills: 1 | Status: SHIPPED | OUTPATIENT
Start: 2021-07-14

## 2021-07-25 ENCOUNTER — TELEPHONE (OUTPATIENT)
Dept: OTHER | Facility: OTHER | Age: 71
End: 2021-07-25

## 2021-07-25 NOTE — TELEPHONE ENCOUNTER
Pt called to cancel his appt for 7/28  Says he had a fall due to the office commute is unable to sit in a car for too long  Pt wanted to provider to know that he injured his buttock area pretty severely  Please reach out to pt at earliest convenience